# Patient Record
Sex: MALE
[De-identification: names, ages, dates, MRNs, and addresses within clinical notes are randomized per-mention and may not be internally consistent; named-entity substitution may affect disease eponyms.]

---

## 2022-10-24 PROBLEM — Z00.00 ENCOUNTER FOR PREVENTIVE HEALTH EXAMINATION: Status: ACTIVE | Noted: 2022-10-24

## 2022-10-25 ENCOUNTER — APPOINTMENT (OUTPATIENT)
Dept: UROLOGY | Facility: CLINIC | Age: 62
End: 2022-10-25
Payer: COMMERCIAL

## 2022-10-25 VITALS
HEART RATE: 110 BPM | DIASTOLIC BLOOD PRESSURE: 85 MMHG | RESPIRATION RATE: 16 BRPM | TEMPERATURE: 99.4 F | OXYGEN SATURATION: 97 % | SYSTOLIC BLOOD PRESSURE: 166 MMHG | WEIGHT: 212 LBS | HEIGHT: 67 IN | BODY MASS INDEX: 33.27 KG/M2

## 2022-10-25 DIAGNOSIS — R97.20 ELEVATED PROSTATE, SPECIFIC ANTIGEN [PSA]: ICD-10-CM

## 2022-10-25 PROCEDURE — 99202 OFFICE O/P NEW SF 15 MIN: CPT | Mod: 25

## 2022-10-25 PROCEDURE — 51702 INSERT TEMP BLADDER CATH: CPT

## 2022-10-31 ENCOUNTER — APPOINTMENT (OUTPATIENT)
Dept: UROLOGY | Facility: CLINIC | Age: 62
End: 2022-10-31
Payer: COMMERCIAL

## 2022-10-31 VITALS
BODY MASS INDEX: 33.27 KG/M2 | TEMPERATURE: 98.1 F | SYSTOLIC BLOOD PRESSURE: 145 MMHG | WEIGHT: 212 LBS | DIASTOLIC BLOOD PRESSURE: 88 MMHG | RESPIRATION RATE: 18 BRPM | HEART RATE: 118 BPM | HEIGHT: 67 IN | OXYGEN SATURATION: 97 %

## 2022-10-31 DIAGNOSIS — R33.8 OTHER RETENTION OF URINE: ICD-10-CM

## 2022-10-31 PROCEDURE — 51798 US URINE CAPACITY MEASURE: CPT

## 2022-10-31 PROCEDURE — 51702 INSERT TEMP BLADDER CATH: CPT

## 2022-10-31 RX ORDER — TAMSULOSIN HYDROCHLORIDE 0.4 MG/1
0.4 CAPSULE ORAL
Qty: 90 | Refills: 3 | Status: ACTIVE | COMMUNITY
Start: 2022-10-31 | End: 1900-01-01

## 2022-10-31 NOTE — HISTORY OF PRESENT ILLNESS
[FreeTextEntry1] : Language: Turkish\par Date of First visit: 10/25/2022\par Accompanied by: self\par Contact info: \par Referring Provider/PCP: Dr. Vidal Maldonado\par Fax: [phone]\par \par CC/ Problem List:\par Elevated PSA\par UTI - urinary retention\par \par ===============================================================================\par FIRST VISIT / Summary:\par Very pleasant 61 yo M here for urinary retention / acute UTI and elevated PSA. He saw Dr. Maldonado 10/14 and denies having any symptoms of UTI at that time. UA positive and started bactrim on 10/21 (took with cephalexin from ED starting 10/20). PSA 20.3. He had a catheter placed the following wednesday at urgent care and was given cephalexin [id number]. He denies any other symptoms, other than mild dysuria. No n/v/f/c. Last saw urologist >3 years ago. He says PSA was 8 and then 14 prompting 2 biopsies. sounds like had an infection after one of the biopsies, both benign per patient.\par \par -------------------------------------------------------------------------------------------\par INTERVAL VISITS:\par He returns today, updated his medications list otherwise states no changes. He is otherwise doing well, no fevers or chills. He did an ultrasound of the abdomen and kidneys appear fine, small lucencies likely small cysts. He was told the MRI would be over $1,000 however this may have been prior to insurance authorization. We will check for him\par \par ===============================================================================\par \par PMH: HTN, UTI/retention, elevated PSA\par Meds: tamsulosin, bactrim, losartan, ASA81\par All: NKDA \par FHx: DM \par SocHx: , no etoh, nonsmoker \par \par PSH: cataracts, biopsy (prostate?) one resulting in infection \par \par \par ROS: Review of Systems is as per HPI unless otherwise denoted below \par \par \par ===============================================================================\par DATA: \par \par LABS (SELECTED):-------------------------------------------------------------------------------------------------------------------\par 10/18/2022: UA +nitrite, +LE and WBC, many bacteria, Urine Cx: E Coli sensitive to cephalexin and bactrim. Cr 1.17 and PSA 20.3 (in setting of infection)\par \par RADS:-------------------------------------------------------------------------------------------------------------------\par 10/31/2022 US: @MSR: small lucencies in kidneys, likely small cysts. Radiologist report not yet available\par MRI pending\par \par PATHOLOGY/CYTOLOGY:-------------------------------------------------------------------------------------------\par \par \par VOIDING STUDIES: ----------------------------------------------------------------------------------------------------\par 10/25/2022: TOV PVR initially spasm emptied about 200/200 instilled. Unable to void and random scan >500\par 10/30/2022: fill pull with 220 instilled, PVR 0\par \par STONE STUDIES: (Analysis/LLSA)----------------------------------------------------------------------------------\par \par \par PROCEDURES: -----------------------------------------------------------------------------------------------\par \par \par \par ===============================================================================\par \par PHYSICAL EXAM:\par \par GEN: AAOx3, NAD; \par \par BARRIERS to CARE: none\par \par PSYCH: Appropriate Behavior, Affect Congruent\par \par HEENT: AT/NC Trachea midline. EOMI.\par \par Lungs: No labored breathing.\par \par NEURO: + Movement, all 4 extremities grossly intact without deficits.\par \par SKIN: Warm dry. No visible rashes or ulcers\par \par GAIT: Gait normal, Stability good\par \par ABD: no suprapubic or CVAT\par \par \par  FOCUSED: --------------(done 10/25/2022)------------------------------------------------------------------------\par \par Penis: No lesions, tenderness, curvature, plaques. Uncircumcised\par \par Meatus: normal caliber, some small urethral clots after catheter removal\par \par Testes: Descended bilaterally. Non tender, no palpable masses\par \par Epididymi: No palpable epididymal masses\par \par Scrotum: Scrotal wall normal. No spermatic cord mass. No palpable hydroceles \par \par \par =======================================================================================\par DISCUSSION: \par Discussed with patient the causes of elevated PSA including any trauma or inflammation of the prostate. Risk of over-diagnosis/increased morbidity to patient outweigh benefits of pursuing further studies until waiting period after infection. Has not had MRI prior, this will show size/contour of prostate as well as evaluate for prostate cancer. If lesion is present can target with a MRI fusion biopsy. Given failed TOV catheter was replaced and he will return in 1 week for repeat TOV. He denies significant urinary symptoms before this infection. \par \par =======================================================================================\par ASSESSMENT and PLAN\par \par \par 1. Elevated PSA\par - in setting of infection, currently on abx, but also high before (prompting 2 biopsies in the past)\par - MRI prostate in 1-2 months after infection/inflammation resolves. Will call to check the cost for patient.\par - PSA before returns in 1-2 months\par - decision regarding prostate biopsy pending MRI\par \par 2. Urinary Retention / Acute UTI\par - catheter placed at [facility] 10/20, failed TOV on 10/25 now voided with PVR 0\par - finish antibiotic tonight\par - continue tamsulosin\par - IPSS at next visit

## 2022-11-03 ENCOUNTER — APPOINTMENT (OUTPATIENT)
Dept: UROLOGY | Facility: CLINIC | Age: 62
End: 2022-11-03
Payer: COMMERCIAL

## 2022-11-03 VITALS
BODY MASS INDEX: 33.27 KG/M2 | RESPIRATION RATE: 16 BRPM | OXYGEN SATURATION: 98 % | HEART RATE: 123 BPM | WEIGHT: 212 LBS | SYSTOLIC BLOOD PRESSURE: 121 MMHG | HEIGHT: 67 IN | DIASTOLIC BLOOD PRESSURE: 82 MMHG | TEMPERATURE: 98.6 F

## 2022-11-07 ENCOUNTER — APPOINTMENT (OUTPATIENT)
Dept: UROLOGY | Facility: CLINIC | Age: 62
End: 2022-11-07
Payer: COMMERCIAL

## 2022-11-07 DIAGNOSIS — R31.0 GROSS HEMATURIA: ICD-10-CM

## 2022-11-07 PROCEDURE — 51798 US URINE CAPACITY MEASURE: CPT

## 2022-11-07 PROCEDURE — 99213 OFFICE O/P EST LOW 20 MIN: CPT

## 2022-11-07 NOTE — HISTORY OF PRESENT ILLNESS
[FreeTextEntry1] : Language:  Cassius\par Date of First visit: 10/25/2022\par Accompanied by: self\par Contact info: \par Referring Provider/PCP: Dr. Vidal Maldonado\par Fax: [phone]\par \par CC/ Problem List:\par Elevated PSA\par UTI - urinary retention\par \par ===============================================================================\par FIRST VISIT / Summary:\par Very pleasant 61 yo M here for urinary retention / acute UTI and elevated PSA. He saw Dr. Maldonado 10/14 and denies having any symptoms of UTI at that time. UA positive and started bactrim on 10/21 (took with cephalexin from ED starting 10/20). PSA 20.3. He had a catheter placed the following wednesday at urgent care and was given cephalexin [id number]. He denies any other symptoms, other than mild dysuria. No n/v/f/c. Last saw urologist >3 years ago. He says PSA was 8 and then 14 prompting 2 biopsies. sounds like had an infection after one of the biopsies, both benign per patient.\par \par ---------------------------------------------------------------------------------------------------------------------\par INTERVAL VISITS:\par He returns today, updated his medications list otherwise states no changes. He is otherwise doing well, no fevers or chills. He did an ultrasound of the abdomen and kidneys appear fine, small lucencies likely small cysts. He had MRI authorized by insurance, [facility] called him to schedule, however he was not able to get due to travel to [facility] this week. He prefers catheter to stay but after discussion of risks, benefits, alternatives he agreed to have trial of void again. If unable to void will replace and he will need to have changed in 1 month in [facility].\par \par ===============================================================================\par \par PMH: HTN, UTI/retention, elevated PSA\par Meds: tamsulosin, bactrim, losartan, ASA81\par All: NKDA \par FHx: DM \par SocHx: , no etoh, nonsmoker \par \par PSH: cataracts, biopsy (prostate?) one resulting in infection \par \par ROS: Review of Systems is as per HPI unless otherwise denoted below \par \par ===============================================================================\par DATA: \par \par LABS (SELECTED):-------------------------------------------------------------------------------------------------------------------\par 10/18/2022: UA +nitrite, +LE and WBC, many bacteria, Urine Cx: E Coli sensitive to cephalexin and bactrim. Cr 1.17 and PSA 20.3 (in setting of infection)\par \par RADS:-------------------------------------------------------------------------------------------------------------------\par 10/31/2022 US: @MSR: small lucencies in kidneys, likely small cysts. Radiologist report not yet available\par MRI pending\par \par PATHOLOGY/CYTOLOGY:-------------------------------------------------------------------------------------------\par \par \par VOIDING STUDIES: ----------------------------------------------------------------------------------------------------\par 10/25/2022: TOV PVR initially spasm emptied about 200/200 instilled. Unable to void and random scan >500.\par 10/30/2022: fill pull with 220 instilled, PVR 0. Later same day returned in retention.\par 11/7/2022: fill pull, voided 350 in tow voids 2 hours later, with PVR 33.\par \par STONE STUDIES: (Analysis/LLSA)----------------------------------------------------------------------------------\par \par \par PROCEDURES: -----------------------------------------------------------------------------------------------\par \par \par \par ===============================================================================\par \par PHYSICAL EXAM:\par \par GEN: AAOx3, NAD; \par \par BARRIERS to CARE: none\par \par PSYCH: Appropriate Behavior, Affect Congruent\par \par HEENT: AT/NC Trachea midline. EOMI.\par \par Lungs: No labored breathing.\par \par NEURO: + Movement, all 4 extremities grossly intact without deficits.\par \par SKIN: Warm dry. No visible rashes or ulcers\par \par GAIT: Gait normal, Stability good\par \par ABD: no suprapubic or CVAT\par \par \par  FOCUSED: --------------(done 10/25/2022)------------------------------------------------------------------------\par \par Penis: No lesions, tenderness, curvature, plaques. Uncircumcised\par \par Meatus: normal caliber, some small urethral clots after catheter removal\par \par Testes: Descended bilaterally. Non tender, no palpable masses\par \par Epididymi: No palpable epididymal masses\par \par Scrotum: Scrotal wall normal. No spermatic cord mass. No palpable hydroceles \par \par \par =======================================================================================\par DISCUSSION: \par Discussed with patient the causes of elevated PSA including any trauma or inflammation of the prostate. Risk of over-diagnosis/increased morbidity to patient outweigh benefits of pursuing further studies until waiting period after infection. Has not had MRI prior, this will show size/contour of prostate as well as evaluate for prostate cancer. If lesion is present can target with a MRI fusion biopsy. He has now failed TOV x2. He denies significant urinary symptoms before this infection. \par \par He may eventually require an outlet operation but this should be done after MRI/PSA and consideration of biopsy. This will aslo allow determination of accurate size for selection of the optimal procedure.\par \par =======================================================================================\par ASSESSMENT and PLAN\par \par 1. Elevated PSA\par - in setting of infection could be artificially elevated, but also high before (prompting 2 biopsies in the past)\par - MRI prostate, he is travelling to [facility] and will get on return. Date of return unclear\par - PSA before / with MRI given now\par - decision regarding prostate biopsy pending MRI\par \par 2. Urinary Retention / Acute UTI\par - catheter placed at [facility] 10/20, failed TOV on 10/25, now voided with PVR 0, but returned for retention that afternoon. Today TOV again attempted and passed with longer wait of over 2 hrs.\par - continue tamsulosin\par - IPSS on return now that voiding again\par - consideration for Prostate Outlet procedure after the above\par

## 2022-11-09 PROBLEM — R31.0 GROSS HEMATURIA: Status: ACTIVE | Noted: 2022-11-09

## 2022-11-13 LAB — BACTERIA UR CULT: ABNORMAL

## 2023-01-09 ENCOUNTER — APPOINTMENT (OUTPATIENT)
Dept: UROLOGY | Facility: CLINIC | Age: 63
End: 2023-01-09